# Patient Record
Sex: MALE | Race: WHITE | Employment: OTHER | URBAN - METROPOLITAN AREA
[De-identification: names, ages, dates, MRNs, and addresses within clinical notes are randomized per-mention and may not be internally consistent; named-entity substitution may affect disease eponyms.]

---

## 2023-11-27 ENCOUNTER — EXTERNAL HOSPITAL ADMISSION (OUTPATIENT)
Dept: NEUROSURGERY | Facility: CLINIC | Age: 83
End: 2023-11-27
Payer: MEDICARE

## 2023-12-27 ENCOUNTER — OFFICE VISIT (OUTPATIENT)
Dept: NEUROSURGERY | Facility: CLINIC | Age: 83
End: 2023-12-27
Payer: MEDICARE

## 2023-12-27 VITALS
DIASTOLIC BLOOD PRESSURE: 72 MMHG | HEART RATE: 61 BPM | WEIGHT: 241 LBS | HEIGHT: 72 IN | BODY MASS INDEX: 32.64 KG/M2 | SYSTOLIC BLOOD PRESSURE: 138 MMHG | TEMPERATURE: 98 F

## 2023-12-27 DIAGNOSIS — S12.490G: Primary | ICD-10-CM

## 2023-12-27 DIAGNOSIS — M48.10 DISH (DIFFUSE IDIOPATHIC SKELETAL HYPEROSTOSIS): ICD-10-CM

## 2023-12-27 PROCEDURE — 99213 OFFICE O/P EST LOW 20 MIN: CPT | Performed by: NURSE PRACTITIONER

## 2023-12-27 PROCEDURE — 1126F AMNT PAIN NOTED NONE PRSNT: CPT | Performed by: NURSE PRACTITIONER

## 2023-12-27 PROCEDURE — 1160F RVW MEDS BY RX/DR IN RCRD: CPT | Performed by: NURSE PRACTITIONER

## 2023-12-27 PROCEDURE — 1036F TOBACCO NON-USER: CPT | Performed by: NURSE PRACTITIONER

## 2023-12-27 PROCEDURE — 1159F MED LIST DOCD IN RCRD: CPT | Performed by: NURSE PRACTITIONER

## 2023-12-27 RX ORDER — AMIODARONE HYDROCHLORIDE 200 MG/1
TABLET ORAL
COMMUNITY
Start: 2022-05-26

## 2023-12-27 RX ORDER — SPIRONOLACTONE 25 MG/1
TABLET ORAL
COMMUNITY
Start: 2023-04-06

## 2023-12-27 RX ORDER — ATORVASTATIN CALCIUM 20 MG/1
20 TABLET, FILM COATED ORAL NIGHTLY
COMMUNITY
Start: 2023-08-24

## 2023-12-27 RX ORDER — SACUBITRIL AND VALSARTAN 24; 26 MG/1; MG/1
TABLET, FILM COATED ORAL
COMMUNITY
Start: 2022-05-25

## 2023-12-27 RX ORDER — CARVEDILOL 12.5 MG/1
12.5 TABLET ORAL 2 TIMES DAILY
COMMUNITY
Start: 2023-07-04

## 2023-12-27 RX ORDER — METOPROLOL SUCCINATE 25 MG/1
25 TABLET, EXTENDED RELEASE ORAL DAILY
COMMUNITY
Start: 2023-11-27

## 2023-12-27 RX ORDER — EZETIMIBE 10 MG
TABLET ORAL DAILY
COMMUNITY
Start: 2023-11-27

## 2023-12-27 ASSESSMENT — ENCOUNTER SYMPTOMS: OCCASIONAL FEELINGS OF UNSTEADINESS: 1

## 2023-12-27 ASSESSMENT — PATIENT HEALTH QUESTIONNAIRE - PHQ9
2. FEELING DOWN, DEPRESSED OR HOPELESS: NOT AT ALL
SUM OF ALL RESPONSES TO PHQ9 QUESTIONS 1 & 2: 0
SUM OF ALL RESPONSES TO PHQ9 QUESTIONS 1 & 2: 0
1. LITTLE INTEREST OR PLEASURE IN DOING THINGS: NOT AT ALL
1. LITTLE INTEREST OR PLEASURE IN DOING THINGS: NOT AT ALL
2. FEELING DOWN, DEPRESSED OR HOPELESS: NOT AT ALL

## 2023-12-27 ASSESSMENT — PAIN SCALES - GENERAL: PAINLEVEL: 0-NO PAIN

## 2023-12-27 NOTE — PROGRESS NOTES
It was a pleasure to see Adriano Weiss on 12/27/2023. He is an 83 y.o. year old male who presents, with a family friend, to the [facility] for evaluation of C6 fracture. Patient is referred by [facility]. PMH is significant for HTN / HPL. He lives at home alone.    Adriano Weiss has had symptoms of neck pain since falling forward at home while reaching for an item on the floor oh 11/22/2023. Progression of symptoms prompted visit to [facility] on 11/28/2023, where he was evaluated by the Neurosurgery team. He had no neck pain, no neuro deficits. He was placed in a rigid cervical collar and instructed to follow up in our office in one month. Thus far, patient has tried rigid cervical collar with improvement of symptoms. He denies change in bowel / bladder function, saddle anesthesia, imbalance, falls, difficulty dressing, difficulty holding / opening objects.     PREVIOUS TREATMENTS  Rigid Cervical collar  NSAIDs  Opioids  Topical    Previous Spine Surgery: No     Smoker: No   Anticoagulation / Antiplatelets: No     ROS: 12 / 12 systems reviewed and are negative unless noted in HPI    ON EXAM:  General: Well developed, awake/alert/oriented x 3, no distress, alert and cooperative  Skin: Warm and dry, no visible lesions / rashes  ENMT: Mucous membranes moist, no apparent injury  Head/Neck: No apparent injury  Respiratory/Thorax: Normal breathing with good chest expansion, thorax symmetric  Cardiovascular: No pitting edema, no JVD  Gastrointestinal: Non-distended  NEUROLOGICAL EXAM:  EOMI, face symmetric  Motor Strength: 5/5 in BUE  Muscle Tone: Normal without spasticity or contractures in all extremities  Muscle Bulk: Normal and symmetric in all extremities  Posture:  -- Cervical: Normal  -- Thoracic: Normal  -- Lumbar : Normal  Finger escape sign: Absent   and release test: Negative  Romberg test: Negative    Adriano Weiss has C5 - 6 anterior osteophyte fracture. We reviewed cervical  spine images from hospitalization at [facility] and discussed cervical collar rationale. We discussed rationale for dynamic cervical imaging and for follow up after imaging to discuss healing of fracture vs continuation of cervical collar.   He verbalizes understanding and agreement with plan  .  TOTAL TIME SPENT:   Time preparing / completing chart 5 MIN  Time spent face to face with patient, including counseling > 30 MIN  Carmen Rosario, APRN-CNP

## 2024-01-31 ENCOUNTER — OFFICE VISIT (OUTPATIENT)
Dept: NEUROSURGERY | Facility: CLINIC | Age: 84
End: 2024-01-31
Payer: MEDICARE

## 2024-01-31 VITALS — HEART RATE: 57 BPM | TEMPERATURE: 97.3 F | WEIGHT: 241 LBS | BODY MASS INDEX: 32.64 KG/M2 | HEIGHT: 72 IN

## 2024-01-31 DIAGNOSIS — M48.10 DISH (DIFFUSE IDIOPATHIC SKELETAL HYPEROSTOSIS): Primary | ICD-10-CM

## 2024-01-31 DIAGNOSIS — S12.490G: ICD-10-CM

## 2024-01-31 PROCEDURE — 1036F TOBACCO NON-USER: CPT | Performed by: NURSE PRACTITIONER

## 2024-01-31 PROCEDURE — 1126F AMNT PAIN NOTED NONE PRSNT: CPT | Performed by: NURSE PRACTITIONER

## 2024-01-31 PROCEDURE — 99213 OFFICE O/P EST LOW 20 MIN: CPT | Performed by: NURSE PRACTITIONER

## 2024-01-31 PROCEDURE — 1159F MED LIST DOCD IN RCRD: CPT | Performed by: NURSE PRACTITIONER

## 2024-01-31 ASSESSMENT — PATIENT HEALTH QUESTIONNAIRE - PHQ9
2. FEELING DOWN, DEPRESSED OR HOPELESS: NOT AT ALL
1. LITTLE INTEREST OR PLEASURE IN DOING THINGS: NOT AT ALL
SUM OF ALL RESPONSES TO PHQ9 QUESTIONS 1 & 2: 0

## 2024-01-31 ASSESSMENT — PAIN SCALES - GENERAL: PAINLEVEL: 0-NO PAIN

## 2024-01-31 NOTE — PROGRESS NOTES
Adriano Weiss is here today in follow up for C5 - 6 anterior osteophyte fracture and to review images. To review, he was initially evaluated on 12/27/2023. He reported falling from chair on 11/22/2023; he presented to [facility] ED on 11/28/2023, where fracture was noted on CT of C Spine. He was treated with rigid cervical collar. At his visit, he felt improvement with use of cervical collar. On exam, he had no deficits due to fracture. Orders were written for dynamic cervical spine x-rays prior to follow up.    Today, he denies neck pain, no arm pain / paresthesia / weakness. No change in ambulation since prior to falling. He had imaging completed at [facility] on 01/24/2024, and is here to review findings.    TREATMENTS:  Rigid cervical collar    SMOKER: No  ANTICOAGULANT USE: No    ROS x 10 is, otherwise, negative unless documented in HPI above    Pulse 57   Temp 36.3 °C (97.3 °F) (Temporal)   Ht 1.829 m (6')   Wt 109 kg (241 lb)   BMI 32.69 kg/m²     On Exam: Appears comfortable  A&O x 4, speech clear / fluent  Cervical spine ROM is limited as expected with significant DISH noted on prior imaging.  Respirations even / unlabored  Abdomen without distension  FOSTER  Gait: steady with use of cane    We reviewed x-rays of cervical spine with patient and noted no instability with flexion / extension. We discussed okay to remove cervical collar. He agrees to follow up PRN.